# Patient Record
Sex: FEMALE | Employment: OTHER | URBAN - METROPOLITAN AREA
[De-identification: names, ages, dates, MRNs, and addresses within clinical notes are randomized per-mention and may not be internally consistent; named-entity substitution may affect disease eponyms.]

---

## 2024-11-21 ENCOUNTER — APPOINTMENT (EMERGENCY)
Dept: RADIOLOGY | Facility: HOSPITAL | Age: 79
End: 2024-11-21

## 2024-11-21 ENCOUNTER — HOSPITAL ENCOUNTER (EMERGENCY)
Facility: HOSPITAL | Age: 79
Discharge: HOME | End: 2024-11-21
Attending: EMERGENCY MEDICINE

## 2024-11-21 VITALS
RESPIRATION RATE: 19 BRPM | OXYGEN SATURATION: 98 % | TEMPERATURE: 98 F | SYSTOLIC BLOOD PRESSURE: 160 MMHG | DIASTOLIC BLOOD PRESSURE: 94 MMHG | HEART RATE: 100 BPM

## 2024-11-21 DIAGNOSIS — M25.562 ACUTE PAIN OF LEFT KNEE: Primary | ICD-10-CM

## 2024-11-21 PROCEDURE — 99283 EMERGENCY DEPT VISIT LOW MDM: CPT

## 2024-11-21 PROCEDURE — 99284 EMERGENCY DEPT VISIT MOD MDM: CPT | Performed by: EMERGENCY MEDICINE

## 2024-11-21 PROCEDURE — 73564 X-RAY EXAM KNEE 4 OR MORE: CPT

## 2024-11-21 RX ORDER — LIDOCAINE 50 MG/G
2 PATCH TOPICAL ONCE
Status: DISCONTINUED | OUTPATIENT
Start: 2024-11-21 | End: 2024-11-22 | Stop reason: HOSPADM

## 2024-11-21 RX ORDER — NAPROXEN 500 MG/1
500 TABLET ORAL 2 TIMES DAILY PRN
Qty: 30 TABLET | Refills: 0 | Status: SHIPPED | OUTPATIENT
Start: 2024-11-21

## 2024-11-21 RX ORDER — TRAMADOL HYDROCHLORIDE 50 MG/1
50 TABLET ORAL EVERY 6 HOURS PRN
Qty: 15 TABLET | Refills: 0 | Status: SHIPPED | OUTPATIENT
Start: 2024-11-21 | End: 2024-12-01

## 2024-11-21 RX ORDER — ACETAMINOPHEN 325 MG/1
650 TABLET ORAL ONCE
Status: COMPLETED | OUTPATIENT
Start: 2024-11-21 | End: 2024-11-21

## 2024-11-21 RX ORDER — NAPROXEN 500 MG/1
250 TABLET ORAL ONCE
Status: COMPLETED | OUTPATIENT
Start: 2024-11-21 | End: 2024-11-21

## 2024-11-21 RX ORDER — TRAMADOL HYDROCHLORIDE 50 MG/1
50 TABLET ORAL ONCE
Status: COMPLETED | OUTPATIENT
Start: 2024-11-21 | End: 2024-11-21

## 2024-11-21 RX ADMIN — ACETAMINOPHEN 650 MG: 325 TABLET, FILM COATED ORAL at 23:05

## 2024-11-21 RX ADMIN — TRAMADOL HYDROCHLORIDE 50 MG: 50 TABLET ORAL at 23:05

## 2024-11-21 RX ADMIN — LIDOCAINE 2 PATCH: 50 PATCH TOPICAL at 23:05

## 2024-11-21 RX ADMIN — NAPROXEN 250 MG: 500 TABLET ORAL at 22:51

## 2024-11-22 NOTE — ED PROVIDER NOTES
Time reflects when diagnosis was documented in both MDM as applicable and the Disposition within this note       Time User Action Codes Description Comment    11/21/2024 11:00 PM Chase Ervin Add [M25.562] Acute pain of left knee           ED Disposition       ED Disposition   Discharge    Condition   Stable    Date/Time   Thu Nov 21, 2024 11:00 PM    Comment   Kaylan Marisol discharge to home/self care.                   Assessment & Plan       Medical Decision Making  Obtain x-ray of left knee  Give pain medication and continue to monitor patient for any worsening symptoms.    X-ray showed concern for joint space narrowing suggesting osteoarthritis.  No obvious bony injuries noted.  Formal radiology reading is pending.  At this time patient's pain is most likely secondary to musculoskeletal strain/sprain.  Patient discharged home with pain medication and follow-up to orthopedic surgery for further evaluation management.  Close return instructions given to return to the ER for any worsening symptoms.  Patient agrees with discharge plan.  Patient well appearing at time of discharge.    Please Note: Fluency Direct voice recognition software may have been used in the creation of this document. Wrong words or sound a like substitutions may have occurred due to the inherent limitations of the voice software.         Amount and/or Complexity of Data Reviewed  Radiology: ordered and independent interpretation performed. Decision-making details documented in ED Course.    Risk  OTC drugs.  Prescription drug management.             Medications   lidocaine (LIDODERM) 5 % patch 2 patch (2 patches Topical Medication Applied 11/21/24 2305)   naproxen (NAPROSYN) tablet 250 mg (250 mg Oral Given 11/21/24 2251)   acetaminophen (TYLENOL) tablet 650 mg (650 mg Oral Given 11/21/24 2305)   traMADol (ULTRAM) tablet 50 mg (50 mg Oral Given 11/21/24 2305)       ED Risk Strat Scores                           SBIRT 22yo+   "    Flowsheet Row Most Recent Value   Initial Alcohol Screen: US AUDIT-C     1. How often do you have a drink containing alcohol? 0 Filed at: 11/21/2024 2109   2. How many drinks containing alcohol do you have on a typical day you are drinking?  0 Filed at: 11/21/2024 2109   3b. FEMALE Any Age, or MALE 65+: How often do you have 4 or more drinks on one occassion? 0 Filed at: 11/21/2024 2109   Audit-C Score 0 Filed at: 11/21/2024 2109   MICHEL: How many times in the past year have you...    Used an illegal drug or used a prescription medication for non-medical reasons? Never Filed at: 11/21/2024 2109                            History of Present Illness       Chief Complaint   Patient presents with    Knee Pain     L knee. Started yesterday, started in calf but now only in L knee \"in my tendon in the back coming around to the front below the knee cap. No unilateral swelling. \"It's difficult to walk d/t the pain\"       Past Medical History:   Diagnosis Date    HTN (hypertension)       History reviewed. No pertinent surgical history.   History reviewed. No pertinent family history.   Social History     Tobacco Use    Smoking status: Never    Smokeless tobacco: Never      E-Cigarette/Vaping      E-Cigarette/Vaping Substances      I have reviewed and agree with the history as documented.     79-year-old female presents to the ED for evaluation of left knee pain.  Patient recently came to the country from [place].  Yesterday patient was walking and when she bent her knee she felt popping sensation to the posterior left knee.  Since then she has had difficulty walking.  She did not take anything for pain.  Patient is visiting her nephew.  Nephew brought patient to the ED for further evaluation.  Patient denies any fall or other injuries.  Patient denies any numbness or weakness.  Patient and family is primarily Nigerian-speaking.  Language line used for communication.      History provided by:  Patient   " used: Yes    Knee Pain  Associated symptoms: no back pain and no fever        Review of Systems   Constitutional:  Negative for chills and fever.   HENT:  Negative for ear pain and sore throat.    Eyes:  Negative for pain and visual disturbance.   Respiratory:  Negative for cough and shortness of breath.    Cardiovascular:  Negative for chest pain and palpitations.   Gastrointestinal:  Negative for abdominal pain and vomiting.   Genitourinary:  Negative for dysuria and hematuria.   Musculoskeletal:  Positive for arthralgias. Negative for back pain.   Skin:  Negative for color change and rash.   Neurological:  Negative for seizures and syncope.   All other systems reviewed and are negative.          Objective       ED Triage Vitals [11/21/24 2107]   Temperature Pulse Blood Pressure Respirations SpO2 Patient Position - Orthostatic VS   98 °F (36.7 °C) 100 160/94 19 98 % --      Temp Source Heart Rate Source BP Location FiO2 (%) Pain Score    Oral Monitor -- -- 6      Vitals      Date and Time Temp Pulse SpO2 Resp BP Pain Score FACES Pain Rating User   11/21/24 2251 -- -- -- -- -- 6 -- JT   11/21/24 2107 98 °F (36.7 °C) 100 98 % 19 160/94 6 -- JT            Physical Exam  Vitals and nursing note reviewed.   Constitutional:       General: She is not in acute distress.     Appearance: She is well-developed.   HENT:      Head: Normocephalic and atraumatic.   Eyes:      Conjunctiva/sclera: Conjunctivae normal.   Cardiovascular:      Rate and Rhythm: Normal rate and regular rhythm.      Heart sounds: No murmur heard.  Pulmonary:      Effort: Pulmonary effort is normal. No respiratory distress.      Breath sounds: Normal breath sounds.   Abdominal:      Palpations: Abdomen is soft.      Tenderness: There is no abdominal tenderness.   Musculoskeletal:         General: No swelling.      Cervical back: Neck supple.      Comments: Mild tenderness to palpation noted to medial and posterior aspect of left knee.  Pain increases  with flexion of left knee.  No erythema, edema, or warmth to touch noted on examination of bilateral knee.   Skin:     General: Skin is warm and dry.      Capillary Refill: Capillary refill takes less than 2 seconds.   Neurological:      Mental Status: She is alert.   Psychiatric:         Mood and Affect: Mood normal.         Results Reviewed       None            XR knee 4+ views left injury   ED Interpretation by Chase Ervin DO (11/21 3454)   Joint space narrowing noted suggesting osteoarthritis without any obvious bony deformities          Procedures    ED Medication and Procedure Management   None     Discharge Medication List as of 11/21/2024 11:09 PM        START taking these medications    Details   naproxen (Naprosyn) 500 mg tablet Take 1 tablet (500 mg total) by mouth 2 (two) times a day as needed for moderate pain (with meals), Starting Thu 11/21/2024, Normal      traMADol (Ultram) 50 mg tablet Take 1 tablet (50 mg total) by mouth every 6 (six) hours as needed for moderate pain for up to 10 days, Starting Thu 11/21/2024, Until Sun 12/1/2024 at 2359, Normal           No discharge procedures on file.  ED SEPSIS DOCUMENTATION   Time reflects when diagnosis was documented in both MDM as applicable and the Disposition within this note       Time User Action Codes Description Comment    11/21/2024 11:00 PM Chase Ervin Add [M25.562] Acute pain of left knee                  Chase Ervin DO  11/21/24 5701

## 2024-11-26 ENCOUNTER — OFFICE VISIT (OUTPATIENT)
Dept: OBGYN CLINIC | Facility: CLINIC | Age: 79
End: 2024-11-26

## 2024-11-26 VITALS — DIASTOLIC BLOOD PRESSURE: 72 MMHG | WEIGHT: 171 LBS | SYSTOLIC BLOOD PRESSURE: 122 MMHG | HEART RATE: 94 BPM

## 2024-11-26 DIAGNOSIS — M17.12 OSTEOARTHRITIS OF LEFT PATELLOFEMORAL JOINT: ICD-10-CM

## 2024-11-26 DIAGNOSIS — M17.12 PRIMARY OSTEOARTHRITIS OF LEFT KNEE: ICD-10-CM

## 2024-11-26 DIAGNOSIS — Z78.9 NON-ENGLISH SPEAKING PATIENT: ICD-10-CM

## 2024-11-26 DIAGNOSIS — Z75.8 TELEPHONE LANGUAGE INTERPRETER SERVICE REQUIRED: ICD-10-CM

## 2024-11-26 DIAGNOSIS — M25.562 ACUTE PAIN OF LEFT KNEE: Primary | ICD-10-CM

## 2024-11-26 PROCEDURE — 99203 OFFICE O/P NEW LOW 30 MIN: CPT | Performed by: PHYSICIAN ASSISTANT

## 2024-11-26 PROCEDURE — 20610 DRAIN/INJ JOINT/BURSA W/O US: CPT | Performed by: PHYSICIAN ASSISTANT

## 2024-11-26 RX ORDER — TRIAMCINOLONE ACETONIDE 40 MG/ML
80 INJECTION, SUSPENSION INTRA-ARTICULAR; INTRAMUSCULAR
Status: COMPLETED | OUTPATIENT
Start: 2024-11-26 | End: 2024-11-26

## 2024-11-26 RX ORDER — LIDOCAINE HYDROCHLORIDE 10 MG/ML
2 INJECTION, SOLUTION INFILTRATION; PERINEURAL
Status: COMPLETED | OUTPATIENT
Start: 2024-11-26 | End: 2024-11-26

## 2024-11-26 RX ADMIN — LIDOCAINE HYDROCHLORIDE 2 ML: 10 INJECTION, SOLUTION INFILTRATION; PERINEURAL at 14:30

## 2024-11-26 RX ADMIN — TRIAMCINOLONE ACETONIDE 80 MG: 40 INJECTION, SUSPENSION INTRA-ARTICULAR; INTRAMUSCULAR at 14:30

## 2024-11-26 NOTE — PROGRESS NOTES
Orthopaedic Surgery - Office Note  Kaylan Damon (79 y.o. female)   : 1945   MRN: [id number]  Encounter Date: 2024    Chief Complaint   Patient presents with    Left Knee - Pain         Assessment/Plan  Diagnoses and all orders for this visit:    Acute pain of left knee  -     Durable Medical Equipment  -     Ambulatory Referral to Physical Therapy; Future  -     Large joint arthrocentesis: L knee  -     lidocaine (XYLOCAINE) 1 % injection 2 mL  -     triamcinolone acetonide (Kenalog-40) 40 mg/mL injection 80 mg    Primary osteoarthritis of left knee  -     Durable Medical Equipment  -     Ambulatory Referral to Physical Therapy; Future  -     Large joint arthrocentesis: L knee  -     lidocaine (XYLOCAINE) 1 % injection 2 mL  -     triamcinolone acetonide (Kenalog-40) 40 mg/mL injection 80 mg    Non-English speaking patient    Osteoarthritis of left patellofemoral joint-severe  -     Large joint arthrocentesis: L knee  -     lidocaine (XYLOCAINE) 1 % injection 2 mL  -     triamcinolone acetonide (Kenalog-40) 40 mg/mL injection 80 mg    Telephone  service required    The diagnosis as well as treatment options were reviewed with the patient in the office today.  The advanced degenerative changes in the knee seen on x-ray were reviewed.  The definitive treatment would be a total knee arthroplasty however that is not recommended at this time as conservative treatments have not been attempted.  Conservative treatments would include icing the knee 20 minutes on 1 hour off 3 times a day.  Oral anti-inflammatory such as Aleve 1 tablet twice daily with food stopping and calling if any stomach upset occurs.  Tylenol as needed for breakthrough pain.  Knee bracing could be considered.  Physical therapy would be of benefit to optimize range of motion and strength and decreasing of symptoms.  The risks and benefits of an intra-articular cortisone injection were reviewed shared decision  making was utilized.    We reviewed Visco injections as a next step treatment option if incomplete relief of pain is encountered with conservative treatment measures.     Return for Recheck in 3 months for repeat cortisone injection if desired.        History of Present Illness  This is a new patient with left knee pain.  She was seen at the emergency department on 11/21/2024.  She reports pain in the knee after a popping sensation while bending over recently.  She is in the country recently from [place] visiting and a nephew.  She is primarily Maltese-speaking.  She had x-rays taken at the emergency department which were negative for fracture.  She is here today with a relative.  She reports continued pain in the anterior and medial aspect of the knee with regular crunching.  The pain has been present in the knee for years.  She states she has not had any treatment other than the medications given to her at the emergency department.  She has been using an Ace wrap without much relief.  She denies ever having a cortisone injection in the knee.  She reports she was aware that she broke the fibular head in the past.    Review of Systems  Pertinent items are noted in HPI.  All other systems were reviewed and are negative.    Physical Exam  /72   Pulse 94   Wt 77.6 kg (171 lb)   Cons: Appears well.  No apparent distress.  Psych: Alert. Oriented x3.  Mood and affect normal.    Left knee is without skin breakdown lesion or signs of infection.  There is no intra-articular effusion.  She is tender on the medial joint line and on the medial and lateral border of the patella primarily.  She is nontender on the lateral joint line.  There is no instability or pain with valgus and varus stressing.  She has full extension and flexes to 125 degrees.  There is no calf tenderness.  There is a negative Homans.  There are no signs of DVT or infection.  Her gait is within normal limits.  There is moderate retropatellar crepitus  "through her range of motion.  There is no extensor lag.  She is nontender at the fibular head.              Studies Reviewed  XR KNEE 4+ VW LEFT INJURY     INDICATION: pain.     COMPARISON: None     FINDINGS:     No acute fracture or dislocation.  Old fibular neck fracture  No joint effusion.     Moderate tricompartmental osteoarthritis, evidenced by articular cartilage loss, marginal osteophytes, and subchondral sclerosis.     No lytic or blastic osseous lesion.     Unremarkable soft tissues.     IMPRESSION:     No acute osseous abnormality.        Computerized Assisted Algorithm (CAA) may have been used to analyze all applicable images.  X-ray images as well as reports were reviewed by myself in the office today.  Emergency department notes from 11/21/2024 were reviewed by myself in the office today.      Large joint arthrocentesis: L knee  Universal Protocol:  Consent: Verbal consent obtained.  Risks and benefits: risks, benefits and alternatives were discussed  Consent given by: patient  Time out: Immediately prior to procedure a \"time out\" was called to verify the correct patient, procedure, equipment, support staff and site/side marked as required.  Patient understanding: patient states understanding of the procedure being performed  Patient consent: the patient's understanding of the procedure matches consent given  Relevant documents: relevant documents present and verified  Test results: test results available and properly labeled  Site marked: the operative site was marked  Radiology Images displayed and confirmed. If images not available, report reviewed: imaging studies available  Patient identity confirmed: verbally with patient  Supporting Documentation  Indications: pain   Procedure Details  Location: knee - L knee  Preparation: Patient was prepped and draped in the usual sterile fashion  Approach: anterolateral  Medications administered: 2 mL lidocaine 1 %; 80 mg triamcinolone acetonide 40 " mg/mL    Patient tolerance: patient tolerated the procedure well with no immediate complications  Dressing:  Sterile dressing applied            Medical, Surgical, Family, and Social History  The patient's medical history, family history, and social history, were reviewed and updated as appropriate.    Past Medical History:   Diagnosis Date    HTN (hypertension)        History reviewed. No pertinent surgical history.    History reviewed. No pertinent family history.    Social History     Occupational History    Not on file   Tobacco Use    Smoking status: Never    Smokeless tobacco: Never   Substance and Sexual Activity    Alcohol use: Not on file    Drug use: Not on file    Sexual activity: Not on file       No Known Allergies      Current Outpatient Medications:     naproxen (Naprosyn) 500 mg tablet, Take 1 tablet (500 mg total) by mouth 2 (two) times a day as needed for moderate pain (with meals), Disp: 30 tablet, Rfl: 0    traMADol (Ultram) 50 mg tablet, Take 1 tablet (50 mg total) by mouth every 6 (six) hours as needed for moderate pain for up to 10 days, Disp: 15 tablet, Rfl: 0  No current facility-administered medications for this visit.      Jaime Christopher PA-C

## 2024-11-26 NOTE — PATIENT INSTRUCTIONS
Patient Education     Viscosuplementación   ¿Por qué se realiza ruthy procedimiento?   Las articulaciones son los lugares donde se unen dos huesos. Los extremos de los huesos están cubiertos con mel capa protectora de cartílago. [place] ayuda a que se deslicen suavemente catrina el movimiento. Un líquido ayuda asimismo a la articulación a moverse. Sin embargo, tras años de uso el cartílago puede empezar a desgastarse. [place] causa dolor en las articulaciones. Ruthy procedimiento se hace para aliviar el dolor. También se usa un líquido especial para facilitar el deslizamiento de los huesos. También actúa isabela amortiguador. La mayoría de las veces esto se hace en la articulación de la rodilla.  ¿Cuáles serán los resultados?   [place] la articulación  Disminución del dolor en las articulaciones con el movimiento o con el peso corporal  Mejor movilidad o movimiento de las articulaciones  ¿Qué sucede antes del procedimiento?   Es posible que el médico le sugiera otras maneras de tratar la osteoartritis (OA), isabela el ejercicio, fisioterapia, medicamentos para el dolor, compresas calientes y pérdida de peso.  Hable con mueller médico sobre los medicamentos que tome actualmente. [place] incluye todos los medicamentos recetados y de venta earnest, así isabela los suplementos a base de hierbas. Informe al médico si tiene alergia a algún medicamento Lleve consigo mel lista de los medicamentos que está tomando. Algunos medicamentos pueden interactuar con la inyección.  ¿Qué sucede catrina el procedimiento?   El médico limpiará la piel en donde se aplicará la inyección. Se le administrará un medicamento llamado anestesia local. [place] adormecerá la piel y no sentirá ningún dolor.  En algunos casos, mueller médico puede usar herramientas de diagnóstico por imágenes isabela hammad X o ultrasonido para asegurarse de que se inyecte en el lugar correcto.  Si tiene exceso de líquido en la articulación, mueller médico puede extraer el líquido antes de comenzar.  El ácido  hialurónico se inyectará en al articulación con mel aguja. El ácido hialurónico es un líquido natural del cuerpo. [place] la articulación para facilitar el movimiento.  El médico cubrirá el sitio de la inyección con un apósito para prevenir infecciones.  El procedimiento puede llevar tan solo unos minutos.  ¿Qué sucede después del procedimiento?   Puede sentir un dolor ligero y notar inflamación alrededor de la articulación.  Podrá volver a mueller casa después de la inyección.  ¿Qué cuidados se necesitan en casa?   Pregunte al médico qué debe hacer al llegar a casa. Asegúrese de hacer preguntas si no entiende lo que el médico explica. Así sabrá qué debe hacer.  Coloque mel compresa de hielo o mel bolsa de guisantes congelados envueltos en mel toalla sobre la parte del cuerpo que le duela. Nunca coloque el hielo directamente sobre la piel. No deje el hielo más de 10 a 15 minutos por vez.  Descanse catrina los primeros días después del procedimiento. Evite las actividades extenuantes isabela cargar objetos pesados, correr, permanecer mucho tiempo de pie y hacer ejercicio intenso.  ¿Qué cuidados se necesitan en la etapa de seguimiento?   Es posible que el médico le solicite que visite el consultorio para evaluar mueller progreso. No falte a estas citas. Es posible que el médico quiera aplicarle más inyecciones.  ¿Qué cambios en la forma de marta se necesitan?   Pregunte al médico cuándo podrá retomar ryanne actividades cotidianas isabela el ejercicio y el trabajo.  ¿Qué problemas podrían surgir?   Dolor e inflamación alrededor del lugar de la inyección  Infección de la articulación  Fiebre  Reacción alérgica  Picazón  Sarpullido  Hematomas  Hemorragia en la articulación  Sin cambios en el dolor después de la inyección  ¿Dónde puedo obtener más información?   [employer]  [email or web address]/topic.cfm?[email or web address]   Exención de responsabilidad y uso de la información del consumidor   Esta información  general es un resumen limitado de la información sobre el diagnóstico, el tratamiento y/o la medicación. No pretende ser exhaustivo y debe utilizarse isabela mel herramienta para ayudar al usuario a comprender y/o evaluar las posibles opciones de diagnóstico y tratamiento. NO incluye toda la información sobre las enfermedades, los tratamientos, los medicamentos, los efectos secundarios o los riesgos que pueden aplicarse a un paciente específico. No tiene por objeto ser un consejo médico ni un sustituto del consejo médico. [place] pretende reemplazar al diagnóstico o el tratamiento proporcionados por un proveedor de atención médica con base en el examen y la evaluación por parte de ruthy proveedor de las circunstancias específicas y únicas de un paciente. Los pacientes deben hablar con un proveedor de atención médica para obtener información completa sobre mueller rico, preguntas médicas y opciones de tratamiento, incluidos los riesgos o beneficios relacionados con el uso de medicamentos. Esta información no respalda ningún tratamiento o medicamento isabela seguro, eficaz o aprobado para tratar a un paciente específico. [employer] y ryanne afiliados renuncian a cualquier garantía o responsabilidad relacionada con esta información o con el uso que se almaz de esta. El uso de esta información se rige por las Condiciones de uso, disponibles en [email or web address].com/en/know/clinical-effectiveness-terms   Copyright   Copyright © 2024 [employer] y ryanne licenciantes y/o afiliados. Todos los derechos reservados.

## 2025-02-10 NOTE — PROGRESS NOTES
Orthopaedic Surgery - Office Note  Kaylan Damon (79 y.o. female)   : 1945   MRN: [id number]  Encounter Date: 2025    No chief complaint on file.        Assessment/Plan  Diagnoses and all orders for this visit:    Osteoarthritis of left patellofemoral joint-severe    Chronic pain of left knee    Primary osteoarthritis of left knee    Non-English speaking patient    Other orders  -     Large joint arthrocentesis    The diagnosis as well as treatment options were reviewed with patient and nephew in the office today.  The increased risk with a cortisone injection a couple weeks early was reviewed.  After reviewing the risks and benefits with the patient in the office today she elected to proceed with the injection and tolerated it well.  There were no complications.  She was educated not to receive another injection for at least 3 months.  She will ice the knee for comfort.  She will call the office with any questions or concerns.       Return if symptoms worsen or fail to improve.        History of Present Illness  Patient does not speak English and visit is translated through the iPad service: This is a previous patient here for left knee recheck.  She is visiting the country from [place] and was seen with a nephew on 2024 and noted to have severe patellofemoral degenerative changes and received a cortisone injection.  Patient returns today requesting another cortisone injection before she returns back to [place] tomorrow.  She reports she had complete resolution of pain symptoms with the injection and is noticing a slight increase in discomfort returning.  She reports she does not have the availability to get a cortisone injection where she is returning to and would like to have another 1 today.    Review of Systems  Pertinent items are noted in HPI.  All other systems were reviewed and are negative.    Physical Exam  Wt 77.6 kg (171 lb)   Cons: Appears well.  No apparent  distress.  Psych: Alert. Oriented x3.  Mood and affect normal.    Left knee is without skin breakdown lesion or signs of infection.  There is no intra-articular effusion.  She is mildly tender on the medial joint line and moderately tender on the medial and lateral border of the patella primarily.  She is nontender on the lateral joint line.  There is no instability or pain with valgus and varus stressing.  She has full extension and flexes to 125 degrees.  There is no calf tenderness.  There is a negative Homans.  There are no signs of DVT or infection.  Her gait is within normal limits.  There is moderate retropatellar crepitus through her range of motion.  There is no extensor lag.  She is nontender at the fibular head.            Studies Reviewed  XR KNEE 4+ VW LEFT INJURY     INDICATION: pain.     COMPARISON: None     FINDINGS:     No acute fracture or dislocation.  Old fibular neck fracture  No joint effusion.     Moderate tricompartmental osteoarthritis, evidenced by articular cartilage loss, marginal osteophytes, and subchondral sclerosis.     No lytic or blastic osseous lesion.     Unremarkable soft tissues.     IMPRESSION:     No acute osseous abnormality.        Computerized Assisted Algorithm (CAA) may have been used to analyze all applicable images.        Workstation performed: [id number]  X-ray images as well as reports were reviewed by myself in the office today and I agree with radiologist interpretation.  Previous orthopedic notes and emergency department notes from November 2024 were reviewed for today's visit    Large joint arthrocentesis: L knee  Universal Protocol:  Consent: Verbal consent obtained.  Risks and benefits: risks, benefits and alternatives were discussed  Consent given by: patient  Patient understanding: patient states understanding of the procedure being performed  Patient consent: the patient's understanding of the procedure matches consent given  Relevant documents: relevant  documents present and verified  Test results: test results available and properly labeled  Site marked: the operative site was marked  Radiology Images displayed and confirmed. If images not available, report reviewed: imaging studies available  Patient identity confirmed: verbally with patient  Supporting Documentation  Indications: pain   Procedure Details  Location: knee - L knee  Preparation: Patient was prepped and draped in the usual sterile fashion  Needle size: 22 G  Ultrasound guidance: no  Approach: anterolateral  Medications administered: 2 mL lidocaine 1 %; 80 mg triamcinolone acetonide 40 mg/mL    Patient tolerance: patient tolerated the procedure well with no immediate complications  Dressing:  Sterile dressing applied            Medical, Surgical, Family, and Social History  The patient's medical history, family history, and social history, were reviewed and updated as appropriate.    Past Medical History:   Diagnosis Date    HTN (hypertension)        History reviewed. No pertinent surgical history.    History reviewed. No pertinent family history.    Social History     Occupational History    Not on file   Tobacco Use    Smoking status: Never    Smokeless tobacco: Never   Substance and Sexual Activity    Alcohol use: Not on file    Drug use: Not on file    Sexual activity: Not on file       No Known Allergies      Current Outpatient Medications:     naproxen (Naprosyn) 500 mg tablet, Take 1 tablet (500 mg total) by mouth 2 (two) times a day as needed for moderate pain (with meals), Disp: 30 tablet, Rfl: 0      Jaime Christopher PA-C

## 2025-02-11 ENCOUNTER — OFFICE VISIT (OUTPATIENT)
Dept: OBGYN CLINIC | Facility: CLINIC | Age: 80
End: 2025-02-11

## 2025-02-11 VITALS — WEIGHT: 171 LBS

## 2025-02-11 DIAGNOSIS — Z78.9 NON-ENGLISH SPEAKING PATIENT: ICD-10-CM

## 2025-02-11 DIAGNOSIS — M17.12 PRIMARY OSTEOARTHRITIS OF LEFT KNEE: ICD-10-CM

## 2025-02-11 DIAGNOSIS — M25.562 CHRONIC PAIN OF LEFT KNEE: ICD-10-CM

## 2025-02-11 DIAGNOSIS — M17.12 OSTEOARTHRITIS OF LEFT PATELLOFEMORAL JOINT: Primary | ICD-10-CM

## 2025-02-11 DIAGNOSIS — G89.29 CHRONIC PAIN OF LEFT KNEE: ICD-10-CM

## 2025-02-11 PROCEDURE — 20610 DRAIN/INJ JOINT/BURSA W/O US: CPT | Performed by: PHYSICIAN ASSISTANT

## 2025-02-11 PROCEDURE — 99213 OFFICE O/P EST LOW 20 MIN: CPT | Performed by: PHYSICIAN ASSISTANT

## 2025-02-11 RX ORDER — LIDOCAINE HYDROCHLORIDE 10 MG/ML
2 INJECTION, SOLUTION INFILTRATION; PERINEURAL
Status: COMPLETED | OUTPATIENT
Start: 2025-02-11 | End: 2025-02-11

## 2025-02-11 RX ORDER — TRIAMCINOLONE ACETONIDE 40 MG/ML
80 INJECTION, SUSPENSION INTRA-ARTICULAR; INTRAMUSCULAR
Status: COMPLETED | OUTPATIENT
Start: 2025-02-11 | End: 2025-02-11

## 2025-02-11 RX ADMIN — LIDOCAINE HYDROCHLORIDE 2 ML: 10 INJECTION, SOLUTION INFILTRATION; PERINEURAL at 14:30

## 2025-02-11 RX ADMIN — TRIAMCINOLONE ACETONIDE 80 MG: 40 INJECTION, SUSPENSION INTRA-ARTICULAR; INTRAMUSCULAR at 14:30
